# Patient Record
Sex: FEMALE | Race: WHITE | NOT HISPANIC OR LATINO | URBAN - NONMETROPOLITAN AREA
[De-identification: names, ages, dates, MRNs, and addresses within clinical notes are randomized per-mention and may not be internally consistent; named-entity substitution may affect disease eponyms.]

---

## 2017-01-18 ENCOUNTER — OFFICE VISIT (OUTPATIENT)
Dept: OBSTETRICS AND GYNECOLOGY | Facility: CLINIC | Age: 32
End: 2017-01-18

## 2017-01-18 VITALS
DIASTOLIC BLOOD PRESSURE: 72 MMHG | WEIGHT: 246 LBS | BODY MASS INDEX: 48.29 KG/M2 | HEIGHT: 60 IN | SYSTOLIC BLOOD PRESSURE: 128 MMHG

## 2017-01-18 DIAGNOSIS — N88.8 CERVICAL MASS: Primary | ICD-10-CM

## 2017-01-18 DIAGNOSIS — B37.2 SKIN YEAST INFECTION: ICD-10-CM

## 2017-01-18 PROCEDURE — 99204 OFFICE O/P NEW MOD 45 MIN: CPT | Performed by: OBSTETRICS & GYNECOLOGY

## 2017-01-18 PROCEDURE — 76830 TRANSVAGINAL US NON-OB: CPT | Performed by: OBSTETRICS & GYNECOLOGY

## 2017-01-18 RX ORDER — NYSTATIN 100000 U/G
CREAM TOPICAL
Refills: 2 | COMMUNITY
Start: 2016-11-22 | End: 2019-08-07

## 2017-01-18 RX ORDER — FLUTICASONE PROPIONATE 50 MCG
SPRAY, SUSPENSION (ML) NASAL
Refills: 3 | COMMUNITY
Start: 2017-01-06 | End: 2019-08-07

## 2017-01-18 RX ORDER — FLUCONAZOLE 150 MG/1
TABLET ORAL
Qty: 2 TABLET | Refills: 0 | Status: SHIPPED | OUTPATIENT
Start: 2017-01-18 | End: 2019-08-07

## 2017-01-18 NOTE — PATIENT INSTRUCTIONS
Tinea Versicolor  Tinea versicolor is a skin infection that is caused by a type of yeast. It causes a rash that shows up as light or dark patches on the skin. It often occurs on the chest, back, neck, or upper arms. The condition usually does not cause other problems. In most cases, it goes away in a few weeks with treatment. The infection cannot be spread by person to another person.  HOME CARE  · Take medicines only as told by your doctor.  · Scrub your skin every day with a dandruff shampoo as told by your doctor.  · Do not scratch your skin in the rash area.  · Avoid places that are hot and humid.  · Do not use tanning booths.  · Try to avoid sweating a lot.  GET HELP IF:  · Your symptoms get worse.  · You have a fever.  · You have redness, swelling, or pain in the area of your rash.  · You have fluid, blood, or pus coming from your rash.  · Your rash comes back after treatment.     This information is not intended to replace advice given to you by your health care provider. Make sure you discuss any questions you have with your health care provider.     Document Released: 11/30/2009 Document Revised: 01/08/2016 Document Reviewed: 09/29/2015  Similar Pages Interactive Patient Education ©2016 [employer]

## 2017-01-18 NOTE — MR AVS SNAPSHOT
Gwen Nelson   1/18/2017 2:10 PM   Office Visit    Dept Phone:  [phone]   Encounter #:  [id number]    Provider:  Clarice Pierce MD   Department:  [facility] OBSTETRICS AND GYNECOLOGY                Your Full Care Plan              Today's Medication Changes          These changes are accurate as of: 1/18/17  2:43 PM.  If you have any questions, ask your nurse or doctor.               New Medication(s)Ordered:     fluconazole 150 MG tablet   Commonly known as:  DIFLUCAN   1 po now and repeat in 3 d.   Started by:  Clarice Pierce MD            Where to Get Your Medications      You can get these medications from any pharmacy     Bring a paper prescription for each of these medications     fluconazole 150 MG tablet                  Your Updated Medication List          This list is accurate as of: 1/18/17  2:43 PM.  Always use your most recent med list.                fluconazole 150 MG tablet   Commonly known as:  DIFLUCAN   1 po now and repeat in 3 d.       fluticasone 50 MCG/ACT nasal spray   Commonly known as:  FLONASE       nystatin 915998 UNIT/GM cream   Commonly known as:  MYCOSTATIN               We Performed the Following     US Non-ob Transvaginal       You Were Diagnosed With        Codes Comments    Cervical mass    -  Primary ICD-10-CM: N88.8  ICD-9-CM: 622.8     Skin yeast infection     ICD-10-CM: B37.2  ICD-9-CM: 112.3       Instructions    Tinea Versicolor  Tinea versicolor is a skin infection that is caused by a type of yeast. It causes a rash that shows up as light or dark patches on the skin. It often occurs on the chest, back, neck, or upper arms. The condition usually does not cause other problems. In most cases, it goes away in a few weeks with treatment. The infection cannot be spread by person to another person.  HOME CARE  · Take medicines only as told by your doctor.  · Scrub your skin every day with a dandruff shampoo as told by your doctor.  · Do  not scratch your skin in the rash area.  · Avoid places that are hot and humid.  · Do not use tanning booths.  · Try to avoid sweating a lot.  GET HELP IF:  · Your symptoms get worse.  · You have a fever.  · You have redness, swelling, or pain in the area of your rash.  · You have fluid, blood, or pus coming from your rash.  · Your rash comes back after treatment.     This information is not intended to replace advice given to you by your health care provider. Make sure you discuss any questions you have with your health care provider.     Document Released: 2009 Document Revised: 2016 Document Reviewed: 2015  [employer] Interactive Patient Education © [employer]       Patient Instructions History      Upcoming Appointments     Visit Type Date Time Department    NEW GYNECOLOGICAL 2017  2:10 PM [facility]    ULTRASOUND 2017  1:40 PM [facility]    OB ULTRASOUND 2017  1:00 PM [facility]    GYN FOLLOW UP 2017  1:20 PM [facility]      MyChart Signup     [facility] [employer] allows you to send messages to your doctor, view your test results, renew your prescriptions, schedule appointments, and more. To sign up, go to [employer] and click on the Sign Up Now link in the New User? box. Enter your [employer] Activation Code exactly as it appears below along with the last four digits of your Social Security Number and your Date of Birth () to complete the sign-up process. If you do not sign up before the expiration date, you must request a new code.    [employer] Activation Code: [email or web address]  Expires: 2017  2:40 PM    If you have questions, you can email [email or web address] or call [phone] to talk to our [employer] staff. Remember, [employer] is NOT to be used for urgent needs. For medical emergencies, dial 911.               Other Info from Your Visit           Your Appointments     2017  1:00 PM EDT   OB  "ULTRASOUND with ULTRASOUND ROOM OBGYN [facility] OBSTETRICS AND GYNECOLOGY (--)    [address]  [place] [facility] [address] [phone]            Jun 21, 2017  1:20 PM EDT   GYN FOLLOW UP with Clarice Pierce MD   [facility] OBSTETRICS AND GYNECOLOGY (--)    [address]  [place] [facility] [address] [phone]              Allergies     No Known Allergies      Reason for Visit     cystic mass referral [facility] Dept. right cervical cyst, patient advised also having symptoms of yeast in groin and breast area.       Vital Signs     Blood Pressure Height Weight Last Menstrual Period Breastfeeding? Body Mass Index    128/72 60\" (152.4 cm) 246 lb (112 kg) 01/07/2017 No 48.04 kg/m2    Smoking Status                   Never Smoker           Problems and Diagnoses Noted     Cervical mass    -  Primary    Skin yeast infection            "

## 2017-01-19 NOTE — PROGRESS NOTES
17    Gwen Omar    1985      Chief Complaint   Patient presents with   • cystic mass     referral [facility] Dept. right cervical cyst, patient advised also having symptoms of yeast in groin and breast area.        Patient is 31 y.o.  here for evaluation of an abnormality found at the time her annual examination.  The patient reports having a Pap smear performed in November which was normal.  The patient reports on examination a cystic mass was noted.  Per the referral form the patient was noted to have a cystic mass on the right side of the cervix approximately 2 cm.  The patient denies any pelvic or abdominal pain.  She denies any vaginal discharge.  The patient reports regular menses every 28 days lasting 4 days.  The patient reports menses are heavy with occasional cramping.  The patient currently uses condoms for birth control.  Also of note the patient reports recurrent yeast infections beneath the pannus as well as beneath the breast bilaterally.  She had recently been given a cream per her primary care provider and reports continued symptoms.  The patient denies diabetes.  Patient denies any family history of GYN malignancy.      History reviewed. No pertinent past medical history.        No current outpatient prescriptions on file prior to visit.     No current facility-administered medications on file prior to visit.          Past Surgical History   Procedure Laterality Date   • Eye surgery     • Mouth surgery             Social History     Social History   • Marital status:      Spouse name: N/A   • Number of children: N/A   • Years of education: N/A     Social History Main Topics   • Smoking status: Never Smoker   • Smokeless tobacco: Never Used   • Alcohol use No   • Drug use: No   • Sexual activity: Yes     Partners: Male     Other Topics Concern   • None     Social History Narrative   • None         Review of Systems   Constitutional: Negative.    HENT: Negative.   "  Eyes: Negative.    Respiratory: Negative.    Cardiovascular: Negative.    Gastrointestinal: Negative.    Endocrine: Negative.    Genitourinary: Negative.    Musculoskeletal: Negative.    Skin: Negative.    Allergic/Immunologic: Negative.    Neurological: Negative.    Hematological: Negative.    Psychiatric/Behavioral: Negative.                  Vitals:    01/18/17 1327   BP: 128/72   Weight: 246 lb (112 kg)   Height: 60\" (152.4 cm)       Physical Exam   Constitutional: She is oriented to person, place, and time. She appears well-developed and well-nourished. No distress.   HENT:   Head: Normocephalic and atraumatic.   Eyes: Conjunctivae are normal.   Neck: Normal range of motion. Neck supple.   Abdominal: Soft. Bowel sounds are normal. She exhibits no distension and no mass. There is no tenderness. There is no rebound and no guarding.   Genitourinary: Uterus normal. Pelvic exam was performed with patient supine. There is no rash, tenderness, lesion or injury on the right labia. There is no rash, tenderness, lesion or injury on the left labia. Cervix exhibits no motion tenderness, no discharge and no friability. Right adnexum displays no mass, no tenderness and no fullness. Left adnexum displays no mass, no tenderness and no fullness.   Genitourinary Comments: The vagina is mildly atrophic.  The cervix was grossly normal.  No visible lesions were noted however on palpation there is noted to be a fullness in the right vaginal sidewall.   Neurological: She is alert and oriented to person, place, and time.   Skin: Skin is warm and dry. She is not diaphoretic. No erythema. No pallor.   The patient is noted to have erythema and satellite lesions beneath the pannus as well as the breast bilaterally.   Psychiatric: She has a normal mood and affect. Her behavior is normal.   Nursing note and vitals reviewed.        1. Cervical mass  The patient was referred for a questionable cystic mass on the cervix.  Examination was " performed as noted above.  A transvaginal ultrasound was also performed for further evaluation.  There was noted to be an approximately 1.7 cm cystic lesion and what appears to be the vaginal sidewall.  The uterus was normal in appearance endometrium measures 7.4 mm.  The ovaries were normal.  There is no free fluid seen.  Reassurance was given to the patient.  The patient is asymptomatic.  The patient will return to the office in 4-6 months for repeat transvaginal ultrasound.  The patient is to sign to obtain a copy of recent Pap smear.  - US Non-ob Transvaginal  - US Non-ob Transvaginal; Future    2. Skin yeast infection  The patient has erythema and satellite lesions of the skin consistent with a yeast infection.  Prescription is given for Diflucan as noted below.  The patient is also to continue her topical cream as given per her primary care physician.  - fluconazole (DIFLUCAN) 150 MG tablet; 1 po now and repeat in 3 d.  Dispense: 2 tablet; Refill: 0      Requested Prescriptions     Signed Prescriptions Disp Refills   • fluconazole (DIFLUCAN) 150 MG tablet 2 tablet 0     Si po now and repeat in 3 d.       Return in about 5 months (around 2017), or if symptoms worsen or fail to improve.    Clarice Pierce MD

## 2017-07-05 ENCOUNTER — TELEPHONE (OUTPATIENT)
Dept: OBSTETRICS AND GYNECOLOGY | Facility: CLINIC | Age: 32
End: 2017-07-05

## 2017-07-05 NOTE — TELEPHONE ENCOUNTER
----- Message from Clarice Pierce MD sent at 7/4/2017 11:07 AM EDT -----  Need to inform pt I reviewed TVS from 6/21/2017 which shows normal uterus and ET; small cyst seen vaginal wall unchanged 1.9 cm; if patient with normal problems then may repeat in 1 year.  Need to document in chart.

## 2019-08-07 ENCOUNTER — OFFICE VISIT (OUTPATIENT)
Dept: OBSTETRICS AND GYNECOLOGY | Facility: CLINIC | Age: 34
End: 2019-08-07

## 2019-08-07 VITALS
WEIGHT: 251 LBS | HEIGHT: 65 IN | DIASTOLIC BLOOD PRESSURE: 78 MMHG | BODY MASS INDEX: 41.82 KG/M2 | SYSTOLIC BLOOD PRESSURE: 126 MMHG

## 2019-08-07 DIAGNOSIS — Z12.39 ENCOUNTER FOR BREAST CANCER SCREENING OTHER THAN MAMMOGRAM: ICD-10-CM

## 2019-08-07 DIAGNOSIS — Z01.419 ENCOUNTER FOR GYNECOLOGICAL EXAMINATION (GENERAL) (ROUTINE) WITHOUT ABNORMAL FINDINGS: Primary | ICD-10-CM

## 2019-08-07 PROCEDURE — 99395 PREV VISIT EST AGE 18-39: CPT | Performed by: OBSTETRICS & GYNECOLOGY

## 2019-08-07 RX ORDER — CYANOCOBALAMIN 1000 UG/ML
INJECTION, SOLUTION INTRAMUSCULAR; SUBCUTANEOUS
Refills: 11 | COMMUNITY
Start: 2019-07-25

## 2019-08-07 RX ORDER — LORATADINE 10 MG/1
TABLET ORAL
Refills: 3 | COMMUNITY
Start: 2019-07-25

## 2019-08-07 NOTE — PROGRESS NOTES
Subjective  Chief Complaint   Patient presents with   • Gynecologic Exam     Patient is 34 y.o.  here for her annual examination.  Patient reports her menses are occurring monthly but will vary and timing.  Patient reports she is changing a pad every 3 hours.  Patient reports she has had recent labs with her primary care physician.  Patient has been receiving B12 injections.  Patient is not aware of any anemia.  Patient denies any other changes in her health care.  Patient denies any family history of breast cancer or GYN malignancy.    History  Past Medical History:   Diagnosis Date   • B12 deficiency      Current Outpatient Medications on File Prior to Visit   Medication Sig Dispense Refill   • ALLERGY 10 MG tablet   3   • cyanocobalamin 1000 MCG/ML injection   11   • [DISCONTINUED] fluconazole (DIFLUCAN) 150 MG tablet 1 po now and repeat in 3 d. 2 tablet 0   • [DISCONTINUED] fluticasone (FLONASE) 50 MCG/ACT nasal spray   3   • [DISCONTINUED] nystatin (MYCOSTATIN) 049323 UNIT/GM cream   2     No current facility-administered medications on file prior to visit.      No Known Allergies  Past Surgical History:   Procedure Laterality Date   • EYE SURGERY     • MOUTH SURGERY       Family History   Problem Relation Age of Onset   • No Known Problems Father    • No Known Problems Mother    • No Known Problems Brother    • No Known Problems Sister    • No Known Problems Son    • No Known Problems Daughter    • No Known Problems Paternal Grandfather    • No Known Problems Paternal Grandmother    • No Known Problems Maternal Grandmother    • No Known Problems Maternal Grandfather    • No Known Problems Maternal Aunt    • No Known Problems Maternal Uncle    • No Known Problems Paternal Aunt    • No Known Problems Paternal Uncle      Social History     Socioeconomic History   • Marital status:      Spouse name: Not on file   • Number of children: Not on file   • Years of education: Not on file   • Highest  "education level: Not on file   Tobacco Use   • Smoking status: Never Smoker   • Smokeless tobacco: Never Used   Substance and Sexual Activity   • Alcohol use: No   • Drug use: No   • Sexual activity: Yes     Partners: Male     Review of Systems  The following systems were reviewed and negative:  constitution, eyes, ENT, respiratory, cardiovascular, gastrointestinal, genitourinary, integument, breast, hematologic / lymphatic, musculoskeletal, neurological, behavioral/psych, endocrine and allergies / immunologic     Objective  Vitals:    08/07/19 1017   BP: 126/78   Weight: 114 kg (251 lb)   Height: 165.1 cm (65\")     Physical Exam:  General Appearance: alert, appears stated age and cooperative  Head: normocephalic, without obvious abnormality and atraumatic  Eyes: lids and lashes normal, conjunctivae and sclerae normal, no icterus, no pallor, corneas clear and PERRLA  Ears: ears appear intact with no abnormalities noted  Nose: nares normal, septum midline, mucosa normal and no drainage  Neck: suppple, trachea midline and no thyromegaly  Lungs: clear to auscultation, respirations regular, respirations even and respirations unlabored  Heart: regular rhythm and normal rate, normal S1, S2, no murmur, gallop, or rubs and no click  Breasts: Examined in supine position  Symmetric without masses or skin dimpling  Nipples normal without inversion, lesions or discharge  There are no palpable axillary nodes  Abdomen: normal bowel sounds, no masses, no hepatomegaly, no splenomegaly, soft non-tender, no guarding and no rebound tenderness  Pelvic: Clinical staff was present for exam  External genitalia:  normal appearance of the external genitalia including Bartholin's and Guinda's glands.  :  urethral meatus normal;  Vaginal:  normal pink mucosa without prolapse or lesions.  Cervix:  normal appearance.  Uterus:  normal size, shape and consistency.  Adnexa:  normal bimanual exam of the adnexa.  Pap smear done and specimen sent " using Thin-Prep technique  Extremities: moves extremities well, no edema, no cyanosis and no redness  Skin: no bleeding, bruising or rash and no lesions noted  Lymph Nodes: no palpable adenopathy  Neuro: CN II-X grossly intact; sensation intact  Psych: normal mood and affect, oriented to person, time and place, thought content organized and appropriate judgment  Lab Review   No data reviewed    Imaging   No data reviewed    Decision to Obtain Medical Records  No    Summary of Medical Records  No    Assessment/Plan  Problem List Items Addressed This Visit     None      Visit Diagnoses     Encounter for gynecological examination (general) (routine) without abnormal findings    -  Primary  Pap was done today.  If she does not receive the results of the Pap within 2 weeks  time, she was instructed to call to find out the results.  I explained to Gwen that the recommendations for Pap smear interval in a low risk patient has lengthened to 3 years time if cytology alone normal or  5 years time if both cytology and HPV testing were normal.  I encouraged her to be seen yearly for a full physical exam including breast and pelvic exam even during the off years when PAP's will not be performed.  The patient is instructed to follow-up if continued worsening of her menses as patient will need further evaluation with transvaginal ultrasound.    Encounter for breast cancer screening other than mammogram      Gwen was counseled regarding having clinical breast exams and breast self-awareness.  Women aged 29-39 years of age should have clinical breast exams every 1-3 years and yearly aged 40 and older.  The patient was counseled regarding breast self-awareness focusing on having a sense of what is normal for her breasts so that she can tell if there are changes.  Even small changes should be reported to provider.        Follow up as discussed/scheduled  Note: Speech recognition transcription software may have been used to dictate  portions of this document.  An attempt at proofreading has been made though minor errors in transcription may still be present.  This note was electronically signed.  Clarice Pierce M.D.

## 2019-08-16 DIAGNOSIS — Z01.419 ENCOUNTER FOR GYNECOLOGICAL EXAMINATION (GENERAL) (ROUTINE) WITHOUT ABNORMAL FINDINGS: ICD-10-CM
